# Patient Record
Sex: MALE | Race: NATIVE HAWAIIAN OR OTHER PACIFIC ISLANDER
[De-identification: names, ages, dates, MRNs, and addresses within clinical notes are randomized per-mention and may not be internally consistent; named-entity substitution may affect disease eponyms.]

---

## 2018-09-07 ENCOUNTER — HOSPITAL ENCOUNTER (OUTPATIENT)
Dept: HOSPITAL 31 - C.SDS | Age: 68
Discharge: HOME | End: 2018-09-07
Attending: OTOLARYNGOLOGY
Payer: MEDICARE

## 2018-09-07 VITALS
SYSTOLIC BLOOD PRESSURE: 157 MMHG | OXYGEN SATURATION: 100 % | HEART RATE: 77 BPM | RESPIRATION RATE: 18 BRPM | DIASTOLIC BLOOD PRESSURE: 88 MMHG

## 2018-09-07 VITALS — TEMPERATURE: 98 F

## 2018-09-07 DIAGNOSIS — J34.2: Primary | ICD-10-CM

## 2018-09-07 DIAGNOSIS — J34.3: ICD-10-CM

## 2018-09-07 PROCEDURE — 88304 TISSUE EXAM BY PATHOLOGIST: CPT

## 2018-09-07 PROCEDURE — 30140 RESECT INFERIOR TURBINATE: CPT

## 2018-09-07 PROCEDURE — 30520 REPAIR OF NASAL SEPTUM: CPT

## 2018-09-07 NOTE — OP
PROCEDURE DATE:  09/07/2018



PREOPERATIVE DIAGNOSES:  Deviated septum, enlarged turbinates.



POSTOPERATIVE DIAGNOSES:  Deviated septum, enlarged turbinates.

 

PROCEDURE:  Septoplasty, endoscopic bilateral inferior turbinate submucosal

reduction.



SIGNIFICANT FINDINGS:  Deviated septum, large inferior turbinates.



DESCRIPTION OF PROCEDURE:  The patient was brought into the room, placed in

supine position.  Anesthesia was initiated through an ET tube.  Shoulder

roll was placed.  The patient was draped in usual manner. 

Adrenaline-soaked pledgets were inserted into nasal cavity, it remained

there for at least five minutes and removed.  The patient was draped in

usual manner.  The septum was injected on both sides with lidocaine with

epinephrine.  Hemitransfixion incision was made on the left and

mucoperichondrial flap was raised.  A vertical incision was made in the

cartilage leaving a 1.5 cm anterior and superior strut, and a

mucoperichondrial flap was raised on the other side.  Deviated portion of

the septum was removed.  A quilting suture was used to suture the two flaps

together and close the hemitransfixion incision.  A 0-degree scope was

inserted into the nasal cavity.  The inferior turbinates were noted to be

enlarged and reduced in size, going from an anterior to posterior, inferior

to superior direction, first on the left, then on the right using scissors.

Bleeding was controlled using suction cautery on both sides.  Stents were

placed.  The patient was taken off anesthesia and taken to recovery room in

stable manner.





__________________________________________

Babak Behin, MD



DD:  09/07/2018 10:02:28

DT:  09/07/2018 10:05:59

Job # [id number]